# Patient Record
Sex: MALE | Race: OTHER | Employment: FULL TIME | URBAN - METROPOLITAN AREA
[De-identification: names, ages, dates, MRNs, and addresses within clinical notes are randomized per-mention and may not be internally consistent; named-entity substitution may affect disease eponyms.]

---

## 2018-09-05 ENCOUNTER — OFFICE VISIT (OUTPATIENT)
Dept: FAMILY MEDICINE CLINIC | Age: 33
End: 2018-09-05

## 2018-09-05 VITALS
SYSTOLIC BLOOD PRESSURE: 130 MMHG | RESPIRATION RATE: 16 BRPM | OXYGEN SATURATION: 98 % | HEIGHT: 70 IN | BODY MASS INDEX: 35.76 KG/M2 | TEMPERATURE: 97.9 F | HEART RATE: 75 BPM | DIASTOLIC BLOOD PRESSURE: 78 MMHG | WEIGHT: 249.8 LBS

## 2018-09-05 DIAGNOSIS — Z11.3 SCREEN FOR STD (SEXUALLY TRANSMITTED DISEASE): ICD-10-CM

## 2018-09-05 DIAGNOSIS — G43.019 INTRACTABLE MIGRAINE WITHOUT AURA AND WITHOUT STATUS MIGRAINOSUS: Primary | ICD-10-CM

## 2018-09-05 DIAGNOSIS — E66.9 OBESITY (BMI 35.0-39.9 WITHOUT COMORBIDITY): ICD-10-CM

## 2018-09-05 RX ORDER — CHOLECALCIFEROL (VITAMIN D3) 125 MCG
CAPSULE ORAL
COMMUNITY
End: 2018-09-05

## 2018-09-05 RX ORDER — NAPROXEN 500 MG/1
500 TABLET ORAL
Qty: 60 TAB | Refills: 2 | Status: SHIPPED | OUTPATIENT
Start: 2018-09-05

## 2018-09-05 RX ORDER — SUMATRIPTAN 50 MG/1
50 TABLET, FILM COATED ORAL
Qty: 30 TAB | Refills: 2 | Status: SHIPPED | OUTPATIENT
Start: 2018-09-05 | End: 2018-09-05

## 2018-09-05 NOTE — PROGRESS NOTES
INTERNAL MEDICINE INITIAL PROVIDER VISIT SUBJECTIVE:  
 
Chief Complaint Patient presents with [facility]  Headache  Weight Gain HPI: 34 y.o. male with PMHx significant for headaches is here for the above chief complaint(s). Headaches: Onset 2009. Sharp pain and throbbing frontal headaches non radiating. Now radiating side of head to back of neck. Lasting >12 hours. Worse with movement. Some sensitivity to light, not sound. No aura. Does have \"light headache starting above eye\". Only on right side. In last month have occurred 7 x. Aleve used to help until 9 months ago. Excedrin migraine sometimes helpful. BC powder not helpful. No known triggers. No nausea or vomiting associated with headaches. Has had brain imaging CT scan which was negative 2011. Motrin 800 mg not helpful. Weight Gain/Morbid Obesity: 2015 after leaving [employer]. 60 lb since then. Eating 2 meals a day, max 3 which is atypical, usually lunch and dinner. Exercise includes nothing recently. Last time July 4 x per week, weight lifting and some cardio every other day typically about ~1 hr.   
 
ROS: 12 point ROS completed, positive per HPI plus weight gain, intermittently N/V last occurred 2 days ago and anxiety/stress. Current Outpatient Prescriptions Medication Sig  
 naproxen (NAPROSYN) 500 mg tablet Take 1 Tab by mouth two (2) times daily as needed. headache  SUMAtriptan (IMITREX) 50 mg tablet Take 1 Tab by mouth once as needed for Migraine for up to 1 dose. Max 100 mg per 24 hours No current facility-administered medications for this visit. Health Maintenance Topic Date Due  
 DTaP/Tdap/Td series (1 - Tdap) 11/30/2005  Influenza Age 5 to Adult  08/01/2018 Medications and Allergies: Reviewed and confirmed in the chart Past Medical Hx: Reviewed and confirmed in the chart Past Medical History:  
Diagnosis Date  Chronic headaches 2009  Obesity (BMI 35.0-39.9 without comorbidity)  Tattoos Patient Active Problem List  
Diagnosis Code  Obesity (BMI 35.0-39.9 without comorbidity) E66.9  
 Intractable migraine without aura and without status migrainosus G43.019 Family Hx, Surgical Hx, Social Hx: Reviewed and updated in EMR OBJECTIVE: 
Vitals:  
 09/05/18 1351 BP: 130/78 Pulse: 75 Resp: 16 Temp: 97.9 °F (36.6 °C) TempSrc: Oral  
SpO2: 98% Weight: 249 lb 12.8 oz (113.3 kg) Height: 5' 10\" (1.778 m) BP Readings from Last 3 Encounters:  
09/05/18 130/78  
09/17/16 128/82 Wt Readings from Last 3 Encounters:  
09/05/18 249 lb 12.8 oz (113.3 kg) 09/17/16 240 lb (108.9 kg) General: Pleasant adult man in no acute distress HEENT: Head is atraumatic normo-cephalic. Pupils equally round and reactive to light, extraocular muscle intact, conjunctiva clear, non-icterus. Oral mucosa moist without erythema, ulceration. Neck: Supple, no LAD, no palpable thyromegaly. CVS: . Heart regular, rate, and rhythm. Audible S1 and S2. No murmurs, rubs or gallops. PULM: Lungs clear to auscultation bilaterally. No wheezes, rales or rhonchi. GI: Positive bowel sounds, soft, nondistended, non-tender. EXT: 2+ radial pulses bilaterally. No pedal edema bilaterally Neuro: Alert and oriented x 3. Strength 5/5 bilateral UE/LE proximal and distal muscle groups including . CN III-XII intact. Gait wnl. MSE: mood euthymic, affect congruent and reactive. Nursing Notes Reviewed ASSESSMENT AND PLAN 
  ICD-10-CM ICD-9-CM 1. Intractable migraine without aura and without status migrainosus G43.019 346.11 naproxen (NAPROSYN) 500 mg tablet SUMAtriptan (IMITREX) 50 mg tablet CBC WITH AUTOMATED DIFF  
   TSH 3RD GENERATION  
   T4, FREE  
   METABOLIC PANEL, COMPREHENSIVE 2.  Obesity (BMI 35.0-39.9 without comorbidity) E66.9 278.00 REFERRAL TO NUTRITION  
   LIPID PANEL  
   CBC WITH AUTOMATED DIFF  
 METABOLIC PANEL, COMPREHENSIVE Diet and exercise discussed BMI handout given, education provided 3. Screen for STD (sexually transmitted disease) Z11.3 V74.5 HEP B SURFACE AG  
   HEP B SURFACE AB  
   HEPATITIS C AB T PALLIDUM AB  
   HIV 1/2 AG/AB, 4TH GENERATION,W RFLX CONFIRM  
   CHLAMYDIA/NEISSERIA/TRICHOMONAS AMP Discussed the patient's BMI with him. The BMI follow up plan is as follows:  
 
dietary management education, guidance, and counseling 
encourage exercise 
monitor weight 
prescribed dietary intake An After Visit Summary was printed and given to the patient. Orders Placed This Encounter  HEP B SURFACE AG  
 HEP B SURFACE AB  
 HEPATITIS C AB  T PALLIDUM AB  
 HIV 1/2 AG/AB, 4TH GENERATION,W RFLX CONFIRM  
 CHLAMYDIA/NEISSERIA/TRICHOMONAS AMP ([facility] Only)  LIPID PANEL  
 CBC WITH AUTOMATED DIFF  
 TSH 3RD GENERATION  
 T4, FREE  
 METABOLIC PANEL, COMPREHENSIVE  
 REFERRAL TO NUTRITION  
 DISCONTD: naproxen sodium (ALEVE) 220 mg cap  DISCONTD: aspirin/acetaminophen/caffeine (EXCEDRIN MIGRAINE PO)  naproxen (NAPROSYN) 500 mg tablet  SUMAtriptan (IMITREX) 50 mg tablet Future Appointments Date Time Provider Mariangel Abreu 10/17/2018 8:30 AM Donna Sandhu MD [address] [place] [address] printed and provided to patient Assessment and plan above discussed with patient, patient voiced understanding and agreement with plan. More than 50% of this 45 min visit was spent face to face counseling the patient about the etiology and treatment options for the above health conditions outlined in assessment and plan Donna Sandhu M.D. [employer] [address] [facility], [address] [place], [address] [place] [address] - [phone] Fax - [phone]

## 2018-09-05 NOTE — PROGRESS NOTES
Chief Complaint Patient presents with [employer] [facility]  Headache  Nausea  Weight Gain 1. Have you been to the ER, urgent care clinic since your last visit? Hospitalized since your last visit? No 
 
2. Have you seen or consulted any other health care providers outside of the [address] [place] [address] since your last visit? Include any pap smears or colon screening.  No

## 2018-09-05 NOTE — MR AVS SNAPSHOT
[address] [place] [address] 
[phone] Patient: Tiffany Kelly MRN: [id number] JHX:[id number] Visit Information Date & Time Provider Department Dept. Phone Encounter #  
 9/5/2018  2:00 PM Nanette Zazueta MD [address] [phone] [id number] Upcoming Health Maintenance Date Due DTaP/Tdap/Td series (1 - Tdap) 11/30/2005 Influenza Age 5 to Adult 8/1/2018 Allergies as of 9/5/2018  Review Complete On: 9/5/2018 By: Nanette Zazueta MD  
 No Known Allergies Current Immunizations  Never Reviewed No immunizations on file. Not reviewed this visit You Were Diagnosed With   
  
 Codes Comments Intractable migraine without aura and without status migrainosus    -  Primary ICD-10-CM: G43.019 
ICD-9-CM: 346.11 Obesity (BMI 35.0-39.9 without comorbidity)     ICD-10-CM: E19.2 ICD-9-CM: 278.00 Screen for STD (sexually transmitted disease)     ICD-10-CM: Z11.3 ICD-9-CM: V74.5 Vitals BP Pulse Temp Resp Height(growth percentile) Weight(growth percentile) 130/78 75 97.9 °F (36.6 °C) (Oral) 16 5' 10\" (1.778 m) 249 lb 12.8 oz (113.3 kg) SpO2 BMI Smoking Status 98% 35.84 kg/m2 Never Smoker Vitals History BMI and BSA Data Body Mass Index Body Surface Area  
 35.84 kg/m 2 2.37 m 2 Preferred Pharmacy Pharmacy Name Phone [facility] [phone], [address] [phone] Your Updated Medication List  
  
   
This list is accurate as of 9/5/18  2:34 PM.  Always use your most recent med list.  
  
  
  
  
 naproxen 500 mg tablet Commonly known as:  NAPROSYN Take 1 Tab by mouth two (2) times daily as needed. headache SUMAtriptan 50 mg tablet Commonly known as:  IMITREX Take 1 Tab by mouth once as needed for Migraine for up to 1 dose. Max 100 mg per 24 hours Prescriptions Sent to Pharmacy Refills  
 naproxen (NAPROSYN) 500 mg tablet 2 Sig: Take 1 Tab by mouth two (2) times daily as needed. headache Class: Normal  
 Pharmacy: [facility] [address], [address] Ph #: [phone] Route: Oral  
 SUMAtriptan (IMITREX) 50 mg tablet 2 Sig: Take 1 Tab by mouth once as needed for Migraine for up to 1 dose. Max 100 mg per 24 hours Class: Normal  
 Pharmacy: [facility] [address], [address] Ph #: [phone] Route: Oral  
  
Patient Instructions Return to give blood after fasting 8 hours. No food or coffee. Water and medicines are okay. Lab opens from 8:30AM to 4:30PM Monday through Friday. No need to schedule an appointment. FOR HEADACHES: 
Stop Excedrin migraine Take naproxen 500 mg with food when headache starts, if not better after 30 minutes take Imitrex 50 mg Can repeat Imitrex in 2 hours If not effective, max 100 mg per 24 hours Try to get good sleep, 8 hours per night FOR WEIGHT: 
Work in exercise 4 -5 days per week core/weights with some cardio (no more than 15 minutes) Try to work on low carbohydrate diet, outlined below We are sending a referral to [facility]. You should be called about this in 3-4 weeks. If no word in 4 weeks please give us a call to follow up Migraine Headache: Care Instructions Your Care Instructions Migraines are painful, throbbing headaches that often start on one side of the head. They may cause nausea and vomiting and make you sensitive to light, sound, or smell. Without treatment, migraines can last from 4 hours to a few days. Medicines can help prevent migraines or stop them after they have started. Your doctor can help you find which ones work best for you. Follow-up care is a key part of your treatment and safety. Be sure to make and go to all appointments, and call your doctor if you are having problems. It's also a good idea to know your test results and keep a list of the medicines you take. How can you care for yourself at home? · Do not drive if you have taken a prescription pain medicine. · Rest in a quiet, dark room until your headache is gone. Close your eyes, and try to relax or go to sleep. Don't watch TV or read. · Put a cold, moist cloth or cold pack on the painful area for 10 to 20 minutes at a time. Put a thin cloth between the cold pack and your skin. · Use a warm, moist towel or a heating pad set on low to relax tight shoulder and neck muscles. · Have someone gently massage your neck and shoulders. · Take your medicines exactly as prescribed. Call your doctor if you think you are having a problem with your medicine. You will get more details on the specific medicines your doctor prescribes. · Be careful not to take pain medicine more often than the instructions allow. You could get worse or more frequent headaches when the medicine wears off. To prevent migraines · Keep a headache diary so you can figure out what triggers your headaches. Avoiding triggers may help you prevent headaches. Record when each headache began, how long it lasted, and what the pain was like. (Was it throbbing, aching, stabbing, or dull?) Write down any other symptoms you had with the headache, such as nausea, flashing lights or dark spots, or sensitivity to bright light or loud noise. Note if the headache occurred near your period. List anything that might have triggered the headache. Triggers may include certain foods (chocolate, cheese, wine) or odors, smoke, bright light, stress, or lack of sleep.  
· If your doctor has prescribed medicine for your migraines, take it as directed. You may have medicine that you take only when you get a migraine and medicine that you take all the time to help prevent migraines. ¨ If your doctor has prescribed medicine for when you get a headache, take it at the first sign of a migraine, unless your doctor has given you other instructions. ¨ If your doctor has prescribed medicine to prevent migraines, take it exactly as prescribed. Call your doctor if you think you are having a problem with your medicine. · Find healthy ways to deal with stress. Migraines are most common during or right after stressful times. Take time to relax before and after you do something that has caused a migraine in the past. 
· Try to keep your muscles relaxed by keeping good posture. Check your jaw, face, neck, and shoulder muscles for tension. Try to relax them. When you sit at a desk, change positions often. And make sure to stretch for 30 seconds each hour. · Get plenty of sleep and exercise. · Eat meals on a regular schedule. Avoid foods and drinks that often trigger migraines. These include chocolate, alcohol (especially red wine and port), aspartame, monosodium glutamate (MSG), and some additives found in foods (such as hot dogs, ma, cold cuts, aged cheeses, and pickled foods). · Limit caffeine. Don't drink too much coffee, tea, or soda. But don't quit caffeine suddenly. That can also give you migraines. · Do not smoke or allow others to smoke around you. If you need help quitting, talk to your doctor about stop-smoking programs and medicines. These can increase your chances of quitting for good. · If you are taking birth control pills or hormone therapy, talk to your doctor about whether they are triggering your migraines. When should you call for help? Call 911 anytime you think you may need emergency care. For example, call if: 
  · You have signs of a stroke.  These may include: 
¨ Sudden numbness, paralysis, or weakness in your face, arm, or leg, especially on only one side of your body. ¨ Sudden vision changes. ¨ Sudden trouble speaking. ¨ Sudden confusion or trouble understanding simple statements. ¨ Sudden problems with walking or balance. ¨ A sudden, severe headache that is different from past headaches.  
 Call your doctor now or seek immediate medical care if: 
  · You have new or worse nausea and vomiting.  
  · You have a new or higher fever.  
  · Your headache gets much worse.  
 Watch closely for changes in your health, and be sure to contact your doctor if: 
  · You are not getting better after 2 days (48 hours). Where can you learn more? Go to http://jhonnyBBEfelicitas.info/. Enter V419 in the search box to learn more about \"Migraine Headache: Care Instructions. \" Current as of: October 9, 2017 Content Version: 11.7 © 9679-2454 [employer]. Care instructions adapted under license by [employer] (which disclaims liability or warranty for this information). If you have questions about a medical condition or this instruction, always ask your healthcare professional. [place] [address] any warranty or liability for your use of this information. Learning About Low-Carbohydrate Diets for Weight Loss What is a low-carbohydrate diet? Low-carb diets avoid foods that are high in carbohydrate. These high-carb foods include pasta, bread, rice, cereal, fruits, and starchy vegetables. Instead, these diets usually have you eat foods that are high in fat and protein. Many people lose weight quickly on a low-carb diet. But the early weight loss is water. People on this diet often gain the weight back after they start eating carbs again. Not all diet plans are safe or work well. A lot of the evidence shows that low-carb diets aren't healthy. That's because these diets often don't include healthy foods like fruits and vegetables.  Losing weight safely means balancing protein, fat, and carbs with every meal and snack. And low-carb diets don't always provide the vitamins, minerals, and fiber you need. If you have a serious medical condition, talk to your doctor before you try any diet. These conditions include kidney disease, heart disease, type 2 diabetes, high cholesterol, and high blood pressure. If you are pregnant, it may not be safe for your baby if you are on a low-carb diet. How can you lose weight safely? You might have heard that a diet plan helped another person lose weight. But that doesn't mean that it will work for you. It is very hard to stay on a diet that includes lots of big changes in your eating habits. If you want to get to a healthy weight and stay there, making healthy lifestyle changes will often work better than dieting. These steps can help. · Make a plan for change. Work with your doctor to create a plan that is right for you. · See a dietitian. He or she can show you how to make healthy changes in your eating habits. · Manage stress. If you have a lot of stress in your life, it can be hard to focus on making healthy changes to your daily habits. · Track your food and activity. You are likely to do better at losing weight if you keep track of what you eat and what you do. Follow-up care is a key part of your treatment and safety. Be sure to make and go to all appointments, and call your doctor if you are having problems. It's also a good idea to know your test results and keep a list of the medicines you take. Where can you learn more? Go to http://jhonny-felicitas.info/. Enter A121 in the search box to learn more about \"Learning About Low-Carbohydrate Diets for Weight Loss. \" Current as of: May 12, 2017 Content Version: 11.7 © 9044-7850 [employer].  Care instructions adapted under license by [employer] (which disclaims liability or warranty for this information). If you have questions about a medical condition or this instruction, always ask your healthcare professional. [address] any warranty or liability for your use of this information. Body Mass Index: Care Instructions Your Care Instructions Body mass index (BMI) can help you see if your weight is raising your risk for health problems. It uses a formula to compare how much you weigh with how tall you are. · A BMI lower than 18.5 is considered underweight. · A BMI between 18.5 and 24.9 is considered healthy. · A BMI between 25 and 29.9 is considered overweight. A BMI of 30 or higher is considered obese. If your BMI is in the normal range, it means that you have a lower risk for weight-related health problems. If your BMI is in the overweight or obese range, you may be at increased risk for weight-related health problems, such as high blood pressure, heart disease, stroke, arthritis or joint pain, and diabetes. If your BMI is in the underweight range, you may be at increased risk for health problems such as fatigue, lower protection (immunity) against illness, muscle loss, bone loss, hair loss, and hormone problems. BMI is just one measure of your risk for weight-related health problems. You may be at higher risk for health problems if you are not active, you eat an unhealthy diet, or you drink too much alcohol or use tobacco products. Follow-up care is a key part of your treatment and safety. Be sure to make and go to all appointments, and call your doctor if you are having problems. It's also a good idea to know your test results and keep a list of the medicines you take. How can you care for yourself at home? · Practice healthy eating habits. This includes eating plenty of fruits, vegetables, whole grains, lean protein, and low-fat dairy. · If your doctor recommends it, get more exercise.  Walking is a good choice. Bit by bit, increase the amount you walk every day. Try for at least 30 minutes on most days of the week. · Do not smoke. Smoking can increase your risk for health problems. If you need help quitting, talk to your doctor about stop-smoking programs and medicines. These can increase your chances of quitting for good. · Limit alcohol to 2 drinks a day for men and 1 drink a day for women. Too much alcohol can cause health problems. If you have a BMI higher than 25 · Your doctor may do other tests to check your risk for weight-related health problems. This may include measuring the distance around your waist. A waist measurement of more than 40 inches in men or 35 inches in women can increase the risk of weight-related health problems. · Talk with your doctor about steps you can take to stay healthy or improve your health. You may need to make lifestyle changes to lose weight and stay healthy, such as changing your diet and getting regular exercise. If you have a BMI lower than 18.5 · Your doctor may do other tests to check your risk for health problems. · Talk with your doctor about steps you can take to stay healthy or improve your health. You may need to make lifestyle changes to gain or maintain weight and stay healthy, such as getting more healthy foods in your diet and doing exercises to build muscle. Where can you learn more? Go to http://jhonny-felicitas.info/. Enter S176 in the search box to learn more about \"Body Mass Index: Care Instructions. \" Current as of: October 9, 2017 Content Version: 11.7 © 9627-7332 [employer]. Care instructions adapted under license by [employer] (which disclaims liability or warranty for this information). If you have questions about a medical condition or this instruction, always ask your healthcare professional. [place] [address] any warranty or liability for your use of this information. Introducing [facility]! Naomi Donaldson introduces The [employer] patient portal. Now you can access parts of your medical record, email your doctor's office, and request medication refills online. 1. In your internet browser, go to [email or web address] 2. Click on the First Time User? Click Here link in the Sign In box. You will see the New Member Sign Up page. 3. Enter your [employer] Access Code exactly as it appears below. You will not need to use this code after youve completed the sign-up process. If you do not sign up before the expiration date, you must request a new code. · [employer] Access Code: [email or web address] Expires: 12/4/2018  2:34 PM 
 
4. Enter the last four digits of your Social Security Number (xxxx) and Date of Birth (mm/dd/yyyy) as indicated and click Submit. You will be taken to the next sign-up page. 5. Create a [employer] ID. This will be your [employer] login ID and cannot be changed, so think of one that is secure and easy to remember. 6. Create a [employer] password. You can change your password at any time. 7. Enter your Password Reset Question and Answer. This can be used at a later time if you forget your password. 8. Enter your e-mail address. You will receive e-mail notification when new information is available in [address] 9. Click Sign Up. You can now view and download portions of your medical record. 10. Click the Download Summary menu link to download a portable copy of your medical information. If you have questions, please visit the Frequently Asked Questions section of the [employer] website. Remember, [employer] is NOT to be used for urgent needs. For medical emergencies, dial 911. Now available from your iPhone and Android! Please provide this summary of care documentation to your next provider. Your primary care clinician is listed as NONE. If you have any questions after today's visit, please call [phone].

## 2018-09-05 NOTE — PATIENT INSTRUCTIONS
Return to give blood after fasting 8 hours. No food or coffee. Water and medicines are okay. Lab opens from 8:30AM to 4:30PM Monday through Friday. No need to schedule an appointment. FOR HEADACHES: 
Stop Excedrin migraine Take naproxen 500 mg with food when headache starts, if not better after 30 minutes take Imitrex 50 mg Can repeat Imitrex in 2 hours If not effective, max 100 mg per 24 hours Try to get good sleep, 8 hours per night FOR WEIGHT: 
Work in exercise 4 -5 days per week core/weights with some cardio (no more than 15 minutes) Try to work on low carbohydrate diet, outlined below We are sending a referral to [facility]. You should be called about this in 3-4 weeks. If no word in 4 weeks please give us a call to follow up Migraine Headache: Care Instructions Your Care Instructions Migraines are painful, throbbing headaches that often start on one side of the head. They may cause nausea and vomiting and make you sensitive to light, sound, or smell. Without treatment, migraines can last from 4 hours to a few days. Medicines can help prevent migraines or stop them after they have started. Your doctor can help you find which ones work best for you. Follow-up care is a key part of your treatment and safety. Be sure to make and go to all appointments, and call your doctor if you are having problems. It's also a good idea to know your test results and keep a list of the medicines you take. How can you care for yourself at home? · Do not drive if you have taken a prescription pain medicine. · Rest in a quiet, dark room until your headache is gone. Close your eyes, and try to relax or go to sleep. Don't watch TV or read. · Put a cold, moist cloth or cold pack on the painful area for 10 to 20 minutes at a time. Put a thin cloth between the cold pack and your skin. · Use a warm, moist towel or a heating pad set on low to relax tight shoulder and neck muscles. · Have someone gently massage your neck and shoulders. · Take your medicines exactly as prescribed. Call your doctor if you think you are having a problem with your medicine. You will get more details on the specific medicines your doctor prescribes. · Be careful not to take pain medicine more often than the instructions allow. You could get worse or more frequent headaches when the medicine wears off. To prevent migraines · Keep a headache diary so you can figure out what triggers your headaches. Avoiding triggers may help you prevent headaches. Record when each headache began, how long it lasted, and what the pain was like. (Was it throbbing, aching, stabbing, or dull?) Write down any other symptoms you had with the headache, such as nausea, flashing lights or dark spots, or sensitivity to bright light or loud noise. Note if the headache occurred near your period. List anything that might have triggered the headache. Triggers may include certain foods (chocolate, cheese, wine) or odors, smoke, bright light, stress, or lack of sleep. · If your doctor has prescribed medicine for your migraines, take it as directed. You may have medicine that you take only when you get a migraine and medicine that you take all the time to help prevent migraines. ¨ If your doctor has prescribed medicine for when you get a headache, take it at the first sign of a migraine, unless your doctor has given you other instructions. ¨ If your doctor has prescribed medicine to prevent migraines, take it exactly as prescribed. Call your doctor if you think you are having a problem with your medicine. · Find healthy ways to deal with stress. Migraines are most common during or right after stressful times. Take time to relax before and after you do something that has caused a migraine in the past. 
· Try to keep your muscles relaxed by keeping good posture.  Check your jaw, face, neck, and shoulder muscles for tension. Try to relax them. When you sit at a desk, change positions often. And make sure to stretch for 30 seconds each hour. · Get plenty of sleep and exercise. · Eat meals on a regular schedule. Avoid foods and drinks that often trigger migraines. These include chocolate, alcohol (especially red wine and port), aspartame, monosodium glutamate (MSG), and some additives found in foods (such as hot dogs, ma, cold cuts, aged cheeses, and pickled foods). · Limit caffeine. Don't drink too much coffee, tea, or soda. But don't quit caffeine suddenly. That can also give you migraines. · Do not smoke or allow others to smoke around you. If you need help quitting, talk to your doctor about stop-smoking programs and medicines. These can increase your chances of quitting for good. · If you are taking birth control pills or hormone therapy, talk to your doctor about whether they are triggering your migraines. When should you call for help? Call 911 anytime you think you may need emergency care. For example, call if: 
  · You have signs of a stroke. These may include: 
¨ Sudden numbness, paralysis, or weakness in your face, arm, or leg, especially on only one side of your body. ¨ Sudden vision changes. ¨ Sudden trouble speaking. ¨ Sudden confusion or trouble understanding simple statements. ¨ Sudden problems with walking or balance. ¨ A sudden, severe headache that is different from past headaches.  
 Call your doctor now or seek immediate medical care if: 
  · You have new or worse nausea and vomiting.  
  · You have a new or higher fever.  
  · Your headache gets much worse.  
 Watch closely for changes in your health, and be sure to contact your doctor if: 
  · You are not getting better after 2 days (48 hours). Where can you learn more? Go to http://jhonny-felicitas.info/.  
Enter E691 in the search box to learn more about \"Migraine Headache: Care Instructions. \" Current as of: October 9, 2017 Content Version: 11.7 © 7504-5338 [employer]. Care instructions adapted under license by [employer] (which disclaims liability or warranty for this information). If you have questions about a medical condition or this instruction, always ask your healthcare professional. [address] any warranty or liability for your use of this information. Learning About Low-Carbohydrate Diets for Weight Loss What is a low-carbohydrate diet? Low-carb diets avoid foods that are high in carbohydrate. These high-carb foods include pasta, bread, rice, cereal, fruits, and starchy vegetables. Instead, these diets usually have you eat foods that are high in fat and protein. Many people lose weight quickly on a low-carb diet. But the early weight loss is water. People on this diet often gain the weight back after they start eating carbs again. Not all diet plans are safe or work well. A lot of the evidence shows that low-carb diets aren't healthy. That's because these diets often don't include healthy foods like fruits and vegetables. Losing weight safely means balancing protein, fat, and carbs with every meal and snack. And low-carb diets don't always provide the vitamins, minerals, and fiber you need. If you have a serious medical condition, talk to your doctor before you try any diet. These conditions include kidney disease, heart disease, type 2 diabetes, high cholesterol, and high blood pressure. If you are pregnant, it may not be safe for your baby if you are on a low-carb diet. How can you lose weight safely? You might have heard that a diet plan helped another person lose weight. But that doesn't mean that it will work for you. It is very hard to stay on a diet that includes lots of big changes in your eating habits.  If you want to get to a healthy weight and stay there, making healthy lifestyle changes will often work better than dieting. These steps can help. · Make a plan for change. Work with your doctor to create a plan that is right for you. · See a dietitian. He or she can show you how to make healthy changes in your eating habits. · Manage stress. If you have a lot of stress in your life, it can be hard to focus on making healthy changes to your daily habits. · Track your food and activity. You are likely to do better at losing weight if you keep track of what you eat and what you do. Follow-up care is a key part of your treatment and safety. Be sure to make and go to all appointments, and call your doctor if you are having problems. It's also a good idea to know your test results and keep a list of the medicines you take. Where can you learn more? Go to http://jhonny-felicitas.info/. Enter A121 in the search box to learn more about \"Learning About Low-Carbohydrate Diets for Weight Loss. \" Current as of: May 12, 2017 Content Version: 11.7 © 6222-8981 [employer]. Care instructions adapted under license by [employer] (which disclaims liability or warranty for this information). If you have questions about a medical condition or this instruction, always ask your healthcare professional. [address] any warranty or liability for your use of this information. Body Mass Index: Care Instructions Your Care Instructions Body mass index (BMI) can help you see if your weight is raising your risk for health problems. It uses a formula to compare how much you weigh with how tall you are. · A BMI lower than 18.5 is considered underweight. · A BMI between 18.5 and 24.9 is considered healthy. · A BMI between 25 and 29.9 is considered overweight. A BMI of 30 or higher is considered obese.  
If your BMI is in the normal range, it means that you have a lower risk for weight-related health problems. If your BMI is in the overweight or obese range, you may be at increased risk for weight-related health problems, such as high blood pressure, heart disease, stroke, arthritis or joint pain, and diabetes. If your BMI is in the underweight range, you may be at increased risk for health problems such as fatigue, lower protection (immunity) against illness, muscle loss, bone loss, hair loss, and hormone problems. BMI is just one measure of your risk for weight-related health problems. You may be at higher risk for health problems if you are not active, you eat an unhealthy diet, or you drink too much alcohol or use tobacco products. Follow-up care is a key part of your treatment and safety. Be sure to make and go to all appointments, and call your doctor if you are having problems. It's also a good idea to know your test results and keep a list of the medicines you take. How can you care for yourself at home? · Practice healthy eating habits. This includes eating plenty of fruits, vegetables, whole grains, lean protein, and low-fat dairy. · If your doctor recommends it, get more exercise. Walking is a good choice. Bit by bit, increase the amount you walk every day. Try for at least 30 minutes on most days of the week. · Do not smoke. Smoking can increase your risk for health problems. If you need help quitting, talk to your doctor about stop-smoking programs and medicines. These can increase your chances of quitting for good. · Limit alcohol to 2 drinks a day for men and 1 drink a day for women. Too much alcohol can cause health problems. If you have a BMI higher than 25 · Your doctor may do other tests to check your risk for weight-related health problems. This may include measuring the distance around your waist. A waist measurement of more than 40 inches in men or 35 inches in women can increase the risk of weight-related health problems. · Talk with your doctor about steps you can take to stay healthy or improve your health. You may need to make lifestyle changes to lose weight and stay healthy, such as changing your diet and getting regular exercise. If you have a BMI lower than 18.5 · Your doctor may do other tests to check your risk for health problems. · Talk with your doctor about steps you can take to stay healthy or improve your health. You may need to make lifestyle changes to gain or maintain weight and stay healthy, such as getting more healthy foods in your diet and doing exercises to build muscle. Where can you learn more? Go to http://jhonny-felicitas.info/. Enter S176 in the search box to learn more about \"Body Mass Index: Care Instructions. \" Current as of: October 9, 2017 Content Version: 11.7 © 2568-3490 [employer]. Care instructions adapted under license by [employer] (which disclaims liability or warranty for this information). If you have questions about a medical condition or this instruction, always ask your healthcare professional. [address] any warranty or liability for your use of this information.

## 2018-09-10 ENCOUNTER — LAB ONLY (OUTPATIENT)
Dept: FAMILY MEDICINE CLINIC | Age: 33
End: 2018-09-10

## 2018-09-10 ENCOUNTER — HOSPITAL ENCOUNTER (OUTPATIENT)
Dept: LAB | Age: 33
Discharge: HOME | End: 2018-09-10
Payer: COMMERCIAL

## 2018-09-10 DIAGNOSIS — E66.9 OBESITY (BMI 35.0-39.9 WITHOUT COMORBIDITY): ICD-10-CM

## 2018-09-10 DIAGNOSIS — G43.019 INTRACTABLE MIGRAINE WITHOUT AURA AND WITHOUT STATUS MIGRAINOSUS: ICD-10-CM

## 2018-09-10 DIAGNOSIS — Z11.3 SCREEN FOR STD (SEXUALLY TRANSMITTED DISEASE): ICD-10-CM

## 2018-09-10 DIAGNOSIS — E66.9 OBESITY (BMI 35.0-39.9 WITHOUT COMORBIDITY): Primary | ICD-10-CM

## 2018-09-10 LAB
ALBUMIN SERPL-MCNC: 4.4 G/DL (ref 3.4–5)
ALBUMIN/GLOB SERPL: 1.5 {RATIO} (ref 0.8–1.7)
ALP SERPL-CCNC: 89 U/L (ref 45–117)
ALT SERPL-CCNC: 45 U/L (ref 16–61)
ANION GAP SERPL CALC-SCNC: 7 MMOL/L (ref 3–18)
AST SERPL-CCNC: 27 U/L (ref 15–37)
BASOPHILS # BLD: 0 K/UL (ref 0–0.1)
BASOPHILS NFR BLD: 0 % (ref 0–2)
BILIRUB SERPL-MCNC: 1.7 MG/DL (ref 0.2–1)
BUN SERPL-MCNC: 17 MG/DL (ref 7–18)
BUN/CREAT SERPL: 12 (ref 12–20)
CALCIUM SERPL-MCNC: 9 MG/DL (ref 8.5–10.1)
CHLORIDE SERPL-SCNC: 102 MMOL/L (ref 100–108)
CHOLEST SERPL-MCNC: 190 MG/DL
CO2 SERPL-SCNC: 28 MMOL/L (ref 21–32)
CREAT SERPL-MCNC: 1.42 MG/DL (ref 0.6–1.3)
DIFFERENTIAL METHOD BLD: ABNORMAL
EOSINOPHIL # BLD: 0.1 K/UL (ref 0–0.4)
EOSINOPHIL NFR BLD: 1 % (ref 0–5)
ERYTHROCYTE [DISTWIDTH] IN BLOOD BY AUTOMATED COUNT: 12.3 % (ref 11.6–14.5)
GLOBULIN SER CALC-MCNC: 3 G/DL (ref 2–4)
GLUCOSE SERPL-MCNC: 91 MG/DL (ref 74–99)
HCT VFR BLD AUTO: 48.2 % (ref 36–48)
HDLC SERPL-MCNC: 33 MG/DL (ref 40–60)
HDLC SERPL: 5.8 {RATIO} (ref 0–5)
HGB BLD-MCNC: 16.3 G/DL (ref 13–16)
LDLC SERPL CALC-MCNC: 116.2 MG/DL (ref 0–100)
LIPID PROFILE,FLP: ABNORMAL
LYMPHOCYTES # BLD: 2.1 K/UL (ref 0.9–3.6)
LYMPHOCYTES NFR BLD: 37 % (ref 21–52)
MCH RBC QN AUTO: 30.2 PG (ref 24–34)
MCHC RBC AUTO-ENTMCNC: 33.8 G/DL (ref 31–37)
MCV RBC AUTO: 89.4 FL (ref 74–97)
MONOCYTES # BLD: 0.4 K/UL (ref 0.05–1.2)
MONOCYTES NFR BLD: 7 % (ref 3–10)
NEUTS SEG # BLD: 3.1 K/UL (ref 1.8–8)
NEUTS SEG NFR BLD: 55 % (ref 40–73)
PLATELET # BLD AUTO: 203 K/UL (ref 135–420)
PMV BLD AUTO: 11.2 FL (ref 9.2–11.8)
POTASSIUM SERPL-SCNC: 4.2 MMOL/L (ref 3.5–5.5)
PROT SERPL-MCNC: 7.4 G/DL (ref 6.4–8.2)
RBC # BLD AUTO: 5.39 M/UL (ref 4.7–5.5)
SODIUM SERPL-SCNC: 137 MMOL/L (ref 136–145)
T4 FREE SERPL-MCNC: 0.9 NG/DL (ref 0.7–1.5)
TRIGL SERPL-MCNC: 204 MG/DL (ref ?–150)
TSH SERPL DL<=0.05 MIU/L-ACNC: 1.3 UIU/ML (ref 0.36–3.74)
VLDLC SERPL CALC-MCNC: 40.8 MG/DL
WBC # BLD AUTO: 5.7 K/UL (ref 4.6–13.2)

## 2018-09-10 PROCEDURE — 87340 HEPATITIS B SURFACE AG IA: CPT | Performed by: INTERNAL MEDICINE

## 2018-09-10 PROCEDURE — 84439 ASSAY OF FREE THYROXINE: CPT | Performed by: INTERNAL MEDICINE

## 2018-09-10 PROCEDURE — 84443 ASSAY THYROID STIM HORMONE: CPT | Performed by: INTERNAL MEDICINE

## 2018-09-10 PROCEDURE — 85025 COMPLETE CBC W/AUTO DIFF WBC: CPT | Performed by: INTERNAL MEDICINE

## 2018-09-10 PROCEDURE — 86780 TREPONEMA PALLIDUM: CPT | Performed by: INTERNAL MEDICINE

## 2018-09-10 PROCEDURE — 80053 COMPREHEN METABOLIC PANEL: CPT | Performed by: INTERNAL MEDICINE

## 2018-09-10 PROCEDURE — 86803 HEPATITIS C AB TEST: CPT | Performed by: INTERNAL MEDICINE

## 2018-09-10 PROCEDURE — 87389 HIV-1 AG W/HIV-1&-2 AB AG IA: CPT | Performed by: INTERNAL MEDICINE

## 2018-09-10 PROCEDURE — 86706 HEP B SURFACE ANTIBODY: CPT | Performed by: INTERNAL MEDICINE

## 2018-09-10 PROCEDURE — 80061 LIPID PANEL: CPT | Performed by: INTERNAL MEDICINE

## 2018-09-10 PROCEDURE — 87491 CHLMYD TRACH DNA AMP PROBE: CPT | Performed by: INTERNAL MEDICINE

## 2018-09-10 NOTE — PROGRESS NOTES
Patient presents for lab draw ordered by:    Ordering Provider:  Dr Queta Call Department/Practice:  [facility]  Phone:  [phone]  Date Ordered:  9/2018    The following labs were drawn and sent to [facility]  by Rita López LPN:    CBC, Lipid Profile, CMP, TSH, 3rd Generation and GC/Chlamydia, HIV, Hep B, Hep C, T. Pallidum, T4    The following tubes were sent:     6 SST, 2Lav, 0Blue top, 1urine    Left AC cleansed using aseptic technique. Specimen obtained using a 21 gauge butterfly  with 1 attempt. Patient tolerated process well and there was no bleeding noted at site.

## 2018-09-11 LAB
HBV SURFACE AB SER QL IA: POSITIVE
HBV SURFACE AB SERPL IA-ACNC: >1000 MIU/ML
HBV SURFACE AG SER QL: <0.1 INDEX
HBV SURFACE AG SER QL: NEGATIVE
HCV AB SER IA-ACNC: 0.1 INDEX
HCV AB SERPL QL IA: NEGATIVE
HCV COMMENT,HCGAC: NORMAL
HEP BS AB COMMENT,HBSAC: NORMAL
HIV 1+2 AB+HIV1 P24 AG SERPL QL IA: NONREACTIVE
HIV12 RESULT COMMENT, HHIVC: NORMAL
T PALLIDUM AB SER QL IA: NONREACTIVE

## 2018-09-12 ENCOUNTER — TELEPHONE (OUTPATIENT)
Dept: FAMILY MEDICINE CLINIC | Age: 33
End: 2018-09-12

## 2018-09-12 NOTE — TELEPHONE ENCOUNTER
Called insurance to initiate PA for sumatriptan 50 mg. Wrong number provided on pharmacy fax. Correct number is [id number] member ID [id number]. Per PA number 9 tabs for 30 days is covered and filled today 9/12/2018    Clemencia England M.D.   [address], [address] [place] [address] [phone]  [place] [phone]

## 2018-09-13 LAB
C TRACH RRNA SPEC QL NAA+PROBE: NEGATIVE
N GONORRHOEA RRNA SPEC QL NAA+PROBE: NEGATIVE
SPECIMEN SOURCE: NORMAL
T VAGINALIS RRNA SPEC QL NAA+PROBE: NEGATIVE

## 2018-09-17 ENCOUNTER — TELEPHONE (OUTPATIENT)
Dept: FAMILY MEDICINE CLINIC | Age: 33
End: 2018-09-17

## 2018-09-17 DIAGNOSIS — E78.5 DYSLIPIDEMIA: ICD-10-CM

## 2018-09-17 DIAGNOSIS — N17.9 AKI (ACUTE KIDNEY INJURY) (HCC): Primary | ICD-10-CM

## 2018-09-17 DIAGNOSIS — R11.2 INTRACTABLE VOMITING WITH NAUSEA, UNSPECIFIED VOMITING TYPE: ICD-10-CM

## 2018-09-17 DIAGNOSIS — R17 TOTAL BILIRUBIN, ELEVATED: ICD-10-CM

## 2018-09-17 NOTE — TELEPHONE ENCOUNTER
9/17/2018 Called to discuss lab results. Left VM. Awaiting call back. 9/18/2018 Called again to discuss labs ID verified. All questions answered. Elevated T.Bili: Reports no abdominal pain. Reports nausea, vomiting since february 2018 4 x per week. No fevers, chills. Will obtain RUQ ultrasound. No testosterone or steroid supplements just vitamins. NICOLAS: Water intake per day 4-5 bottles 16 ounce, caffeine intake none. No dysuria, hematuria, incomplete emptying of bladder, change in urinary frequency. Naproxen 2 per week, Excedrin migraine 14 pills per month. Recheck in 2-4 weeks. No h/o kidney stones. Dyslipidemia: low animal fat diet, exercise. Recheck in 6-12 months. STD screen negative. Hep B immune. Johnnie Luz M.D.   [address], [address] [place], [place] [id number]  [place] [phone]

## 2018-09-18 ENCOUNTER — DOCUMENTATION ONLY (OUTPATIENT)
Dept: FAMILY MEDICINE CLINIC | Age: 33
End: 2018-09-18

## 2018-09-18 PROBLEM — R11.2 INTRACTABLE VOMITING WITH NAUSEA: Status: ACTIVE | Noted: 2018-09-18

## 2018-09-18 NOTE — PROGRESS NOTES
Order for Abdominal US received and faxed to [facility]. Fax confirmation shows it was received by [facility].

## 2018-09-25 ENCOUNTER — HOSPITAL ENCOUNTER (OUTPATIENT)
Dept: LAB | Age: 33
Discharge: HOME | End: 2018-09-25
Attending: INTERNAL MEDICINE
Payer: COMMERCIAL

## 2018-09-25 ENCOUNTER — HOSPITAL ENCOUNTER (OUTPATIENT)
Dept: ULTRASOUND IMAGING | Age: 33
Discharge: HOME | End: 2018-09-25
Attending: INTERNAL MEDICINE
Payer: COMMERCIAL

## 2018-09-25 DIAGNOSIS — R11.2 INTRACTABLE VOMITING WITH NAUSEA, UNSPECIFIED VOMITING TYPE: ICD-10-CM

## 2018-09-25 DIAGNOSIS — R17 TOTAL BILIRUBIN, ELEVATED: ICD-10-CM

## 2018-09-25 PROBLEM — K76.0 FATTY LIVER: Status: ACTIVE | Noted: 2018-09-25

## 2018-09-25 LAB
ALBUMIN SERPL-MCNC: 4 G/DL (ref 3.4–5)
ALBUMIN/GLOB SERPL: 1.3 {RATIO} (ref 0.8–1.7)
ALP SERPL-CCNC: 79 U/L (ref 45–117)
ALT SERPL-CCNC: 40 U/L (ref 16–61)
ANION GAP SERPL CALC-SCNC: 8 MMOL/L (ref 3–18)
AST SERPL-CCNC: 20 U/L (ref 15–37)
BILIRUB SERPL-MCNC: 0.8 MG/DL (ref 0.2–1)
BUN SERPL-MCNC: 13 MG/DL (ref 7–18)
BUN/CREAT SERPL: 11 (ref 12–20)
CALCIUM SERPL-MCNC: 8.6 MG/DL (ref 8.5–10.1)
CHLORIDE SERPL-SCNC: 102 MMOL/L (ref 100–108)
CO2 SERPL-SCNC: 29 MMOL/L (ref 21–32)
CREAT SERPL-MCNC: 1.18 MG/DL (ref 0.6–1.3)
GLOBULIN SER CALC-MCNC: 3.1 G/DL (ref 2–4)
GLUCOSE SERPL-MCNC: 103 MG/DL (ref 74–99)
POTASSIUM SERPL-SCNC: 4.5 MMOL/L (ref 3.5–5.5)
PROT SERPL-MCNC: 7.1 G/DL (ref 6.4–8.2)
SODIUM SERPL-SCNC: 139 MMOL/L (ref 136–145)

## 2018-09-25 PROCEDURE — 80053 COMPREHEN METABOLIC PANEL: CPT | Performed by: INTERNAL MEDICINE

## 2018-09-25 PROCEDURE — 36415 COLL VENOUS BLD VENIPUNCTURE: CPT | Performed by: INTERNAL MEDICINE

## 2018-09-25 PROCEDURE — 76705 ECHO EXAM OF ABDOMEN: CPT

## 2018-10-04 ENCOUNTER — HOSPITAL ENCOUNTER (OUTPATIENT)
Dept: NUTRITION | Age: 33
Discharge: HOME | End: 2018-10-04
Payer: COMMERCIAL

## 2018-10-04 ENCOUNTER — TELEPHONE (OUTPATIENT)
Dept: FAMILY MEDICINE CLINIC | Age: 33
End: 2018-10-04

## 2018-10-04 PROBLEM — R17 TOTAL BILIRUBIN, ELEVATED: Status: RESOLVED | Noted: 2018-09-17 | Resolved: 2018-10-04

## 2018-10-04 PROBLEM — R11.2 INTRACTABLE VOMITING WITH NAUSEA: Status: RESOLVED | Noted: 2018-09-18 | Resolved: 2018-10-04

## 2018-10-04 PROBLEM — N17.9 AKI (ACUTE KIDNEY INJURY) (HCC): Status: RESOLVED | Noted: 2018-09-17 | Resolved: 2018-10-04

## 2018-10-04 PROCEDURE — 97802 MEDICAL NUTRITION INDIV IN: CPT

## 2018-10-04 NOTE — TELEPHONE ENCOUNTER
Called to discuss lab results and ultrasound. Left VM awaiting call back. Patient called to discuss lab results. All questions answered. Resolved NICOLAS  Resolved elevate T.Bili    fatty liver on US no gallbladder path. Low cholesterol diet and weight loss  Follow up with nutritionist      Luke Zelaya M.D.   [address], [address] [place] [address], [address] [place] [address] -   [place] [phone]

## 2018-10-04 NOTE — PROGRESS NOTES
[address] [place] [address] Av, [address] [facility], [address] [address] Phone: [phone]  Fax: [phone] Nutrition Assessment  Medical Nutrition Therapy Outpatient Initial Evaluation Patient Name: Carine Stephenson : 1985 Treatment Diagnosis: Obesity Referral Source: Anila Yan MD Start of Care [facility]): 10/4/2018 Gender: male Age: 34 y.o. Ht: 70 in Wt: 246  lb  kg BMI: 35.4 BMR Male  [facility] Female Anthropometrics Assessment: Per BMI, pt is considered morbidly obese. Moderate abdominal adiposity is evident based on visual observation Past Medical History includes: High chol Pertinent Medications:  
See [facility] Biochemical Data:  
No results found for: HBA1C, HGBE8, UJD2GINC, BKY8XBAF Lab Results Component Value Date/Time Cholesterol, total 190 09/10/2018 08:29 AM  
 HDL Cholesterol 33 (L) 09/10/2018 08:29 AM  
 LDL, calculated 116.2 (H) 09/10/2018 08:29 AM  
 VLDL, calculated 40.8 09/10/2018 08:29 AM  
 Triglyceride 204 (H) 09/10/2018 08:29 AM  
 CHOL/HDL Ratio 5.8 (H) 09/10/2018 08:29 AM  
 
Lab Results Component Value Date/Time ALT (SGPT) 40 2018 08:02 AM  
 AST (SGOT) 20 2018 08:02 AM  
 Alk. phosphatase 79 2018 08:02 AM  
 Bilirubin, total 0.8 2018 08:02 AM  
 
Lab Results Component Value Date/Time Creatinine 1.18 2018 08:02 AM  
 
Lab Results Component Value Date/Time BUN 13 2018 08:02 AM  
 
No results found for: MCACR, MCA1, MCA2, MCA3, MCAU, MCAU2, MCALPOCT Subjective/Assessment: 
 Pt is seeking nutrition education for weightloss. He got out of the [email or web address] in  and has steadily gained weight since, especially over the past year. He has moved and ended a serious relationship. He stopped exercising because his weight was still going up. He eat very inconsistently throughout the day, sometimes skipping meals until dinner. When he does eat he chooses somewhat healthy foods. He needs better balance to his meals and more structure to his day without skipping meals. He makes most meals at home and will pack his lunch, but often will go out to eat with coworkers. Discussed ways to avoid these diet pitfalls as well as getting more balance and structure to his nutrition lifestyle. He does want to start to exercise again as well. Provided product ideas, macro nutrient ranges, and overall nutrition education. Pt expressed comprehension, high motivation, and compliance is expected. Current Eating Patterns: SKips Breakfast and sometimes lunch quite often B: honey nut cheerios w/ cashew milk L: 2 sandwiches; roasted chix, gouda on WG w/ water D: progresso chx noodle, grilled cheese; 4 fudge strip cookies Estimate Needs Calories:  1900 Protein: 190 Carbs: 143 Fat: 63  
Kcal/day  g/day  g/day  g/day   
    percent: 40  30  30 Education & Recommendations provided:  Educated pt on carbohydrate food sources, counting carbohydrates, label reading, meal timing, and appropriate serving sizes. Encouraged pt to avoid sugary beverages. Educated pt on using meal builder tool and diet plan. Handouts Provided: [x]  Carbohydrates [x]  Protein []  Fiber 
[x]  Serving Sizes [x]  Meal and Snack Ideas 
[]  Food Journals []  Diabetes []  Cholesterol 
[]  Sodium 
[]  Gen Nutr Guidelines 
[]  SBGM Guidelines 
[x]  Others: Meal Builder Information Reviewed with: Patient Readiness to Change Stage: []  Pre-contemplative    []  Contemplative 
[]  Preparation               [x]  Action                  []  Maintenance Potential Barriers to Learning: []  Decline in memory    []  Language barrier   []  Other: 
[]  Emotional                  []  Limited mobility 
[]  Lack of motivation     [] Vision, hearing or cognitive impairment Expected Compliance: Good Nutritional Goal - To promote lifestyle changes to result in: [x]  Weight loss 
[]  Improved diabetic control 
[]  Decreased cholesterol levels 
[]  Decreased blood pressure 
[]  Weight maintenance []  Preventing any interactions associated with food allergies []  Adequate weight gain toward goal weight 
[]  Other:  
  
 
Patient Goals: SMART goals 1. Follow consistent and appropriate meal timing; follow a structured timeline; space meals by 4-5 hours 2. Focus on good sources of protein; Never eat carbs alone, always pair them with protein, 
 Carb Limits:  45-50 gm @ meals, 15-20 @ snacks 3. Use the meal builder tool and diet plan for portion contol and balance 4. Exercise for a minimum of 30-60 min 4 times per week, advance to daily. Dietitian Signature: Josué Mensah MA, ELOY Date: 10/4/2018 Follow-up: 2 Nov @ 0900 Time: 10:25 AM

## 2018-10-17 ENCOUNTER — OFFICE VISIT (OUTPATIENT)
Dept: FAMILY MEDICINE CLINIC | Age: 33
End: 2018-10-17

## 2018-10-17 VITALS
HEART RATE: 69 BPM | RESPIRATION RATE: 16 BRPM | HEIGHT: 70 IN | BODY MASS INDEX: 35.9 KG/M2 | WEIGHT: 250.8 LBS | SYSTOLIC BLOOD PRESSURE: 111 MMHG | TEMPERATURE: 98.3 F | OXYGEN SATURATION: 96 % | DIASTOLIC BLOOD PRESSURE: 81 MMHG

## 2018-10-17 DIAGNOSIS — R11.15 INTRACTABLE CYCLICAL VOMITING WITH NAUSEA: Primary | ICD-10-CM

## 2018-10-17 DIAGNOSIS — R53.83 OTHER FATIGUE: ICD-10-CM

## 2018-10-17 DIAGNOSIS — G47.09 OTHER INSOMNIA: ICD-10-CM

## 2018-10-17 DIAGNOSIS — Z23 ENCOUNTER FOR IMMUNIZATION: ICD-10-CM

## 2018-10-17 DIAGNOSIS — F41.8 SITUATIONAL ANXIETY: ICD-10-CM

## 2018-10-17 RX ORDER — SUMATRIPTAN 50 MG/1
50 TABLET, FILM COATED ORAL
COMMUNITY

## 2018-10-17 RX ORDER — ONDANSETRON 4 MG/1
4 TABLET, ORALLY DISINTEGRATING ORAL
Qty: 10 TAB | Refills: 2 | Status: SHIPPED | OUTPATIENT
Start: 2018-10-17 | End: 2019-01-15

## 2018-10-17 NOTE — PATIENT INSTRUCTIONS
FOR NAUSEA: 
Return with stool sample for h pylori testing Take zofran as needed for nausea FOR ANXIETY: 
We are sending a referral to [address] [facility] for evaluation of sleep apnea. You should be called about this in 3-4 weeks. If no word in 4 weeks please give us a call to follow up Consider getting book \"when panic attacks\" by Dr. Brent Desai for introduction to cognitive behavioral therapy Continue healthy diet, exercise and water intake. FOR WEIGHT: 
Continue to work on low sugar/carbohydrate diet for weight loss, continue to work with nutritionist 
Exercise continue current plan. Anxiety Disorder: Care Instructions Your Care Instructions Anxiety is a normal reaction to stress. Difficult situations can cause you to have symptoms such as sweaty palms and a nervous feeling. In an anxiety disorder, the symptoms are far more severe. Constant worry, muscle tension, trouble sleeping, nausea and diarrhea, and other symptoms can make normal daily activities difficult or impossible. These symptoms may occur for no reason, and they can affect your work, school, or social life. Medicines, counseling, and self-care can all help. Follow-up care is a key part of your treatment and safety. Be sure to make and go to all appointments, and call your doctor if you are having problems. It's also a good idea to know your test results and keep a list of the medicines you take. How can you care for yourself at home? · Take medicines exactly as directed. Call your doctor if you think you are having a problem with your medicine. · Go to your counseling sessions and follow-up appointments. · Recognize and accept your anxiety. Then, when you are in a situation that makes you anxious, say to yourself, \"This is not an emergency. I feel uncomfortable, but I am not in danger. I can keep going even if I feel anxious. \" · Be kind to your body: 
? Relieve tension with exercise or a massage. ? Get enough rest. 
? Avoid alcohol, caffeine, nicotine, and illegal drugs. They can increase your anxiety level and cause sleep problems. ? Learn and do relaxation techniques. See below for more about these techniques. · Engage your mind. Get out and do something you enjoy. Go to a funny movie, or take a walk or hike. Plan your day. Having too much or too little to do can make you anxious. · Keep a record of your symptoms. Discuss your fears with a good friend or family member, or join a support group for people with similar problems. Talking to others sometimes relieves stress. · Get involved in social groups, or volunteer to help others. Being alone sometimes makes things seem worse than they are. · Get at least 30 minutes of exercise on most days of the week to relieve stress. Walking is a good choice. You also may want to do other activities, such as running, swimming, cycling, or playing tennis or team sports. Relaxation techniques Do relaxation exercises 10 to 20 minutes a day. You can play soothing, relaxing music while you do them, if you wish. · Tell others in your house that you are going to do your relaxation exercises. Ask them not to disturb you. · Find a comfortable place, away from all distractions and noise. · Lie down on your back, or sit with your back straight. · Focus on your breathing. Make it slow and steady. · Breathe in through your nose. Breathe out through either your nose or mouth. · Breathe deeply, filling up the area between your navel and your rib cage. Breathe so that your belly goes up and down. · Do not hold your breath. · Breathe like this for 5 to 10 minutes. Notice the feeling of calmness throughout your whole body. As you continue to breathe slowly and deeply, relax by doing the following for another 5 to 10 minutes: · Tighten and relax each muscle group in your body. You can begin at your toes and work your way up to your head. · Imagine your muscle groups relaxing and becoming heavy. · Empty your mind of all thoughts. · Let yourself relax more and more deeply. · Become aware of the state of calmness that surrounds you. · When your relaxation time is over, you can bring yourself back to alertness by moving your fingers and toes and then your hands and feet and then stretching and moving your entire body. Sometimes people fall asleep during relaxation, but they usually wake up shortly afterward. · Always give yourself time to return to full alertness before you drive a car or do anything that might cause an accident if you are not fully alert. Never play a relaxation tape while you drive a car. When should you call for help? Call 911 anytime you think you may need emergency care. For example, call if: 
  · You feel you cannot stop from hurting yourself or someone else.  
Jeffrey Mcgrath the numbers for these national suicide hotlines: 9-655-400-TALK (7-403.179.9181) and 9-459-VRGJXJA (1-123.454.4046). If you or someone you know talks about suicide or feeling hopeless, get help right away. 
 Watch closely for changes in your health, and be sure to contact your doctor if: 
  · You have anxiety or fear that affects your life.  
  · You have symptoms of anxiety that are new or different from those you had before. Where can you learn more? Go to http://jhonny-felicitas.info/. Enter P754 in the search box to learn more about \"Anxiety Disorder: Care Instructions. \" Current as of: December 7, 2017 Content Version: 11.8 © 6119-7537 [employer]. Care instructions adapted under license by [employer] (which disclaims liability or warranty for this information). If you have questions about a medical condition or this instruction, always ask your healthcare professional. [address] any warranty or liability for your use of this information. Learning About Low-Carbohydrate Diets for Weight Loss What is a low-carbohydrate diet? Low-carb diets avoid foods that are high in carbohydrate. These high-carb foods include pasta, bread, rice, cereal, fruits, and starchy vegetables. Instead, these diets usually have you eat foods that are high in fat and protein. Many people lose weight quickly on a low-carb diet. But the early weight loss is water. People on this diet often gain the weight back after they start eating carbs again. Not all diet plans are safe or work well. A lot of the evidence shows that low-carb diets aren't healthy. That's because these diets often don't include healthy foods like fruits and vegetables. Losing weight safely means balancing protein, fat, and carbs with every meal and snack. And low-carb diets don't always provide the vitamins, minerals, and fiber you need. If you have a serious medical condition, talk to your doctor before you try any diet. These conditions include kidney disease, heart disease, type 2 diabetes, high cholesterol, and high blood pressure. If you are pregnant, it may not be safe for your baby if you are on a low-carb diet. How can you lose weight safely? You might have heard that a diet plan helped another person lose weight. But that doesn't mean that it will work for you. It is very hard to stay on a diet that includes lots of big changes in your eating habits. If you want to get to a healthy weight and stay there, making healthy lifestyle changes will often work better than dieting. These steps can help. · Make a plan for change. Work with your doctor to create a plan that is right for you. · See a dietitian. He or she can show you how to make healthy changes in your eating habits. · Manage stress. If you have a lot of stress in your life, it can be hard to focus on making healthy changes to your daily habits. · Track your food and activity.  You are likely to do better at losing weight if you keep track of what you eat and what you do. Follow-up care is a key part of your treatment and safety. Be sure to make and go to all appointments, and call your doctor if you are having problems. It's also a good idea to know your test results and keep a list of the medicines you take. Where can you learn more? Go to http://jhonny-felicitas.info/. Enter A121 in the search box to learn more about \"Learning About Low-Carbohydrate Diets for Weight Loss. \" Current as of: December 8, 2016 Content Version: 11.3 © 4220-8824 [employer]. Care instructions adapted under license by [employer] (which disclaims liability or warranty for this information). If you have questions about a medical condition or this instruction, always ask your healthcare professional. [address] any warranty or liability for your use of this information. Body Mass Index: Care Instructions Your Care Instructions Body mass index (BMI) can help you see if your weight is raising your risk for health problems. It uses a formula to compare how much you weigh with how tall you are. · A BMI lower than 18.5 is considered underweight. · A BMI between 18.5 and 24.9 is considered healthy. · A BMI between 25 and 29.9 is considered overweight. A BMI of 30 or higher is considered obese. If your BMI is in the normal range, it means that you have a lower risk for weight-related health problems. If your BMI is in the overweight or obese range, you may be at increased risk for weight-related health problems, such as high blood pressure, heart disease, stroke, arthritis or joint pain, and diabetes. If your BMI is in the underweight range, you may be at increased risk for health problems such as fatigue, lower protection (immunity) against illness, muscle loss, bone loss, hair loss, and hormone problems. BMI is just one measure of your risk for weight-related health problems. You may be at higher risk for health problems if you are not active, you eat an unhealthy diet, or you drink too much alcohol or use tobacco products. Follow-up care is a key part of your treatment and safety. Be sure to make and go to all appointments, and call your doctor if you are having problems. It's also a good idea to know your test results and keep a list of the medicines you take. How can you care for yourself at home? · Practice healthy eating habits. This includes eating plenty of fruits, vegetables, whole grains, lean protein, and low-fat dairy. · If your doctor recommends it, get more exercise. Walking is a good choice. Bit by bit, increase the amount you walk every day. Try for at least 30 minutes on most days of the week. · Do not smoke. Smoking can increase your risk for health problems. If you need help quitting, talk to your doctor about stop-smoking programs and medicines. These can increase your chances of quitting for good. · Limit alcohol to 2 drinks a day for men and 1 drink a day for women. Too much alcohol can cause health problems. If you have a BMI higher than 25 · Your doctor may do other tests to check your risk for weight-related health problems. This may include measuring the distance around your waist. A waist measurement of more than 40 inches in men or 35 inches in women can increase the risk of weight-related health problems. · Talk with your doctor about steps you can take to stay healthy or improve your health. You may need to make lifestyle changes to lose weight and stay healthy, such as changing your diet and getting regular exercise. If you have a BMI lower than 18.5 · Your doctor may do other tests to check your risk for health problems. · Talk with your doctor about steps you can take to stay healthy or improve your health.  You may need to make lifestyle changes to gain or maintain weight and stay healthy, such as getting more healthy foods in your diet and doing exercises to build muscle. Where can you learn more? Go to http://jhonny-felicitas.info/. Enter S176 in the search box to learn more about \"Body Mass Index: Care Instructions. \" Current as of: October 13, 2016 Content Version: 11.4 © 7671-2071 [employer]. Care instructions adapted under license by [employer] (which disclaims liability or warranty for this information). If you have questions about a medical condition or this instruction, always ask your healthcare professional. [place] [address] any warranty or liability for your use of this information.

## 2018-10-17 NOTE — PROGRESS NOTES
Chief Complaint Patient presents with  Anxiety  Nausea 1. Have you been to the ER, urgent care clinic since your last visit? Hospitalized since your last visit? No 
 
2. Have you seen or consulted any other health care providers outside of the [address] [place] [address] since your last visit? Include any pap smears or colon screening. No 
 
 
After obtaining consent, and per orders of Dr. Spenser Donnelly, injection of Flu Vaccine 0.5mL given IM in right deltoid by Yessi Acosta LPN. Patient instructed to remain in clinic for 20 minutes afterwards, and to report any adverse reaction to me immediately.

## 2018-10-17 NOTE — PROGRESS NOTES
Internal Medicine Follow Up Visit Note Chief Complaint Patient presents with  Anxiety  Nausea HPI:  Antonio Solano is a 34 y.o. male with history significant for morbid obesity is here for the above complaint(s). Last seen 9/5/2018. Anxiety: Onset worse since leaving  and adjustment to civilian life. At work most symptoms, gets \"uncomfortable\" and trouble concentrating at times, and \"zoning\" out requiring others to repeat themselves. Work currently at [employer], [employer] as well. Has seen a therapist before for anxiety in 2014, 5 sessions, a little helpful, focused more on childhood. For past 2 weeks reports several days of trouble staying asleep, wakes up with \"slightest noise\" and trouble relaxing. Some tiredness associated with decreased sleep. Lives alone. No depressed mood or anxiety. No caffiene intake, sometimes TV/Phone/Reading in bed, 4x per week exercise varies in AM and after work. STOP-BANG: + snoring, + day time fatigue, - observied apneic episodes, - HTN, + BMI >35, - AGE >50, ? neck size >17 in male >15 in female, + male gender. 4/8 intermediate risk ZAY. Nausea: Onset 2/2018, worse during summer 5-8/2018 more often with every meal. Initially improved with vegan diet but then worsened after 3 weeks of this change. Since last visit reports symptoms 1-2 per month. Last occurred yesterday after eating steak and egg breakfast wrap with vomiting shortly after eating. No abdominal pain or cramping associated. Night before shrimp samia. Occurs twice since last visit. No h/o acid reflux symptoms past or present. No diarrhea or constipation or black stools or blood in stools. No fevers or chills. Eating 4 meals a day. No food predilection. pepto-bismal sometimes helpful. Current Outpatient Medications Medication Sig  SUMAtriptan (IMITREX) 50 mg tablet Take 50 mg by mouth once as needed for Migraine.  ondansetron (ZOFRAN ODT) 4 mg disintegrating tablet Take 1 Tab by mouth daily as needed for Nausea for up to 90 days.  naproxen (NAPROSYN) 500 mg tablet Take 1 Tab by mouth two (2) times daily as needed. headache No current facility-administered medications for this visit. Health Maintenance Topic Date Due  
 DTaP/Tdap/Td series (1 - Tdap) 11/30/2005  Influenza Age 5 to Adult  08/01/2018 There is no immunization history for the selected administration types on file for this patient. Allergies and Medications: Reviewed and updated in EMR. Patient Active Problem List  
Diagnosis Code  Obesity (BMI 35.0-39.9 without comorbidity) E66.9  
 Intractable migraine without aura and without status migrainosus G43.019  
 Dyslipidemia E78.5  Fatty liver K76.0  Intractable cyclical vomiting with nausea G43. A1  
 Other insomnia G47.09  
 Other fatigue R53.83  Situational anxiety F41.8 Family History, Social History, Past Medical History, Surgical History: Reviewed and updated in EMR as appropriate. OBJECTIVE:  
Visit Vitals /81 Pulse 69 Temp 98.3 °F (36.8 °C) (Oral) Resp 16 Ht 5' 10\" (1.778 m) Wt 250 lb 12.8 oz (113.8 kg) SpO2 96% BMI 35.99 kg/m² BP Readings from Last 3 Encounters:  
10/17/18 111/81  
09/05/18 130/78  
09/17/16 128/82 Wt Readings from Last 3 Encounters:  
10/17/18 250 lb 12.8 oz (113.8 kg) 09/05/18 249 lb 12.8 oz (113.3 kg) 09/17/16 240 lb (108.9 kg) General: Pleasant adult man in no acute distress HEENT: Head is atraumatic normo-cephalic. CVS: Heart regular, rate, and rhythm. Audible S1 and S2. No murmurs, rubs or gallops. PULM: Lungs clear to auscultation bilaterally. No wheezes, rales or rhonchi. GI: Positive bowel sounds, soft, nondistended, non-tender. EXT: 2+ radial pulses bilaterally. No pedal edema bilaterally Neuro: Alert and oriented x 3. Gait wnl. MSE: mood euthymic, affect congruent and reactive. TP linear goal directed, affect congruent and reactive. No SI/HI. PHQ-9: 1, not difficult at all NORA-7: 1, not difficult at all Nursing Notes Reviewed. LABS/RADIOLOGICAL TESTS: 
 
Lab Results Component Value Date/Time WBC 5.7 09/10/2018 08:29 AM  
 HGB 16.3 (H) 09/10/2018 08:29 AM  
 HCT 48.2 (H) 09/10/2018 08:29 AM  
 PLATELET 650 75/50/6448 08:29 AM  
 
 
Lab Results Component Value Date/Time Cholesterol, total 190 09/10/2018 08:29 AM  
 HDL Cholesterol 33 (L) 09/10/2018 08:29 AM  
 LDL, calculated 116.2 (H) 09/10/2018 08:29 AM  
 Triglyceride 204 (H) 09/10/2018 08:29 AM  
 
 
 
METABOLIC PANEL, COMPREHENSIVE [[id number]] (Order [id number]) Lab Date: 9/25/2018 Department: [facility] Released By:  (auto-released) Authorizing: Chloé Kaminski MD  
Provider Information Ordering User Ordering Provider Authorizing Provider [facility], Lab In MD Chloé Poon MD  
Attending Provider(s) PCP MD Chloé Hussein MD  
9/25/2018  1:09 PM - Ad[facility], Lab In [facility]  
 
Component Value Flag Ref Range Units Status Sodium 139   136 - 145 mmol/L Final  
Potassium 4.5   3.5 - 5.5 mmol/L Final  
Chloride 102   100 - 108 mmol/L Final  
CO2 29   21 - 32 mmol/L Final  
Anion gap 8   3.0 - 18 mmol/L Final  
Glucose 103 Abnormally high   H  74 - 99 mg/dL Final  
BUN 13   7.0 - 18 MG/DL Final  
Creatinine 1.18   0.6 - 1.3 MG/DL Final  
BUN/Creatinine ratio 11 Abnormally low   L  12 - 20   Final  
GFR est AA >60   >60 ml/min/1.73m2 Final  
GFR est non-AA >60   >60 ml/min/1.73m2 Final  
Comment:  
(NOTE) Estimated GFR is calculated using the Modification of Diet in Renal  
Disease (MDRD) Study equation, reported for both  [facility]) and non- Americans (GFRNA), and normalized to 1.73m2  
body surface area. The physician must decide which value applies to  
the patient.  The MDRD study equation should only be used in  
 individuals age 25 or older. It has not been validated for the  
following: pregnant women, patients with serious comorbid conditions,  
or on certain medications, or persons with extremes of body size,  
muscle mass, or nutritional status. Calcium 8.6   8.5 - 10.1 MG/DL Final  
Bilirubin, total 0.8   0.2 - 1.0 MG/DL Final  
ALT (SGPT) 40   16 - 61 U/L Final  
AST (SGOT) 20   15 - 37 U/L Final  
Alk. phosphatase 79   45 - 117 U/L Final  
Protein, total 7.1   6.4 - 8.2 g/dL Final  
Albumin 4.0   3.4 - 5.0 g/dL Final  
Globulin 3.1   2.0 - 4.0 g/dL Final  
A-G Ratio 1.3   0.8 - 1.7   Final  
 
9/10/2018 10:07 PM - Adal, Lab In [facility]  
 
Component Value Flag Ref Range Units Status TSH 1.30   0.36 - 3.74 uIU/mL Final  
 
9/10/2018 10:07 PM - Adal, Lab In [facility]  
 
Component Value Flag Ref Range Units Status T4, Free 0.9   0.7 - 1.5 NG/DL Final  
 
 
Results Willi Baltazar (Accession [id number]) (Order [id number]) Allergies No Known Allergies Result Information Status: Final result (Exam End: 9/25/2018 08:05) Provider Status: Reviewed Show result history Result Comparison Willi Baltazar (Order [id number]) Newer Version Older Version Final result 9/25/2018 11:44 AM   
Ad[facility], Rad Results In This is the newest version No older versions exist  
Narrative EXAM: Limited right upper quadrant abdominal ultrasound INDICATION: Nausea and vomiting. Elevated serum bilirubin. COMPARISON: None.  
 
_______________ FINDINGS:  
 
LIVER: Mildly increased hepatic echogenicity. No focal mass. Normal direction of  
blood flow in the portal vein. BILIARY SYSTEM: No intrahepatic biliary dilatation. Common bile duct is normal  
in caliber measuring 3 mm. GALLBLADDER: No gallstones or gallbladder wall thickening. [Negative sonographic Bell's sign per technologist's report.] RIGHT KIDNEY: cm in length. No hydronephrosis or renal mass. No visible calculi. PANCREAS: Poorly delineated. IVC: Visualized portions are unremarkable in appearance. OTHER: No free intraperitoneal fluid.  
 
_______________ Impression IMPRESSION:  
 
No evidence of gallstones or common ductal dilatation. Increased hepatic  
echogenicity which may represent steatosis and/or steatohepatitis. All lab results and radiological studies were reviewed and discussed with the patient. ASSESSMENT/PLAN:   
  ICD-10-CM ICD-9-CM 1. Intractable cyclical vomiting with nausea: unclear etiology, suspect psychogenic G43. A1 536.2 H PYLORI AG, STOOL 
zofran prn 2. Other insomnia G47.09 780.52 SLEEP MEDICINE REFERRAL 3. Other fatigue R53.83 780.79 SLEEP MEDICINE REFERRAL 4. Situational anxiety F41.8 300.09 REFERRAL TO PSYCHOLOGY 5. Encounter for immunization Z23 V03.89 INFLUENZA VIRUS VAC QUAD,SPLIT,PRESV FREE SYRINGE IM Requested Prescriptions Signed Prescriptions Disp Refills  ondansetron (ZOFRAN ODT) 4 mg disintegrating tablet 10 Tab 2 Sig: Take 1 Tab by mouth daily as needed for Nausea for up to 90 days. Patient verbalized understanding and agreement with the plan. Patient was given an after-visit summary. Follow-up Disposition: 
Return in about 8 weeks (around 12/12/2018), or if symptoms worsen or fail to improve, for anxiety, nausea. or sooner if worsening symptoms. More than 50% of this 25 min visit was spent counseling the patient face to face about etiology and treatment of health conditions outlined in assessment and plan. Elvira Reid M.D. [employer] [address] [place] [address], [address] [place], [address] [place] [address] [phone] Fax - [phone]

## 2018-11-02 ENCOUNTER — HOSPITAL ENCOUNTER (OUTPATIENT)
Dept: NUTRITION | Age: 33
Discharge: HOME | End: 2018-11-02
Payer: COMMERCIAL

## 2018-11-02 PROCEDURE — 97803 MED NUTRITION INDIV SUBSEQ: CPT

## 2018-11-02 NOTE — PROGRESS NOTES
NUTRITION  FOLLOW-UP TREATMENT NOTE Patient Name: Refugio Vazquez         Date: 2018 : 1985    YES Patient  Verified Diagnosis: Obesity In time:   1400             Out time:   1430 Total Treatment Time (min):   30  
SUBJECTIVE/ASSESSMENT Changes in medication or medical history? Any new allergies, surgeries or procedures? NO    If yes, update Summary List  
Pt has been doing well since his last visit. He has been implementing the changes into his lifestyle, focus is on meal timing, using the meal builder tool with diet plan. He is exercising 3 times per week M,W,F and running more. He has moved in with friends and the household is changing their eating habits. He has improved sleep and more energy. He continues to pack his lunch and not skip meals. He has not lost weight, but he has other benefits from changes. Discussed setting up a M[employer] account and tracking intake for one week to get an idea of numbers and balance. Also showed him the plate method to help with portion control. Pt expressed comprehension, high motivation, and compliance is expected. Current Wt: 247 Previous Wt: 246 Wt Change: 1 Achievement of Goals: -1.  Follow consistent and appropriate meal timing; follow a structured timeline; space meals by 4-5 hours  = Met, continue 2. Focus on good sources of protein; Never eat carbs alone, always pair them with protein, 
 Carb Limits:  45-50 gm @ meals, 15-20 @ snacks = Met, continue 3. Use the meal builder tool and diet plan for portion contol and balance = Met, continue 4. Exercise for a minimum of 30-60 min 4 times per week, advance to daily. = Met, continue Patient Education:  [x]  Review current plan with patient  
[]  Other:   
Handouts/ Information Provided: []  Carbohydrates 
[]  Protein []  Fiber 
[]  Serving Sizes []  Fluids 
[]  General guidelines []  Diabetes []  Cholesterol 
[]  Sodium []  SBGM []  Food Journals 
[x]  Others: PLate New Patient Goals: - Use the plate method to help with portion control - Set up an account on My Fitness Pal or other calorie tracking site and track for at least 1 week (7 days) PLAN [x]  Continue on current plan []  Follow-up PRN  
[]  Discharge due to :   
[x]  Next appt: 4 Dec @ 0830 @ [address] [facility] Dietitian: Sandra Brian MA, RD Date: 11/2/2018 Time: 1:46 PM

## 2018-12-04 ENCOUNTER — APPOINTMENT (OUTPATIENT)
Dept: NUTRITION | Age: 33
End: 2018-12-04

## 2019-02-22 ENCOUNTER — DOCUMENTATION ONLY (OUTPATIENT)
Dept: FAMILY MEDICINE CLINIC | Age: 34
End: 2019-02-22

## 2019-02-22 NOTE — PROGRESS NOTES
Letter regarding referral follow upsent to pt was returned to office. Per note on envelope not deliverable as addressed. Unable to contact pt.